# Patient Record
Sex: FEMALE | URBAN - METROPOLITAN AREA
[De-identification: names, ages, dates, MRNs, and addresses within clinical notes are randomized per-mention and may not be internally consistent; named-entity substitution may affect disease eponyms.]

---

## 2021-10-15 ENCOUNTER — EMERGENCY (EMERGENCY)
Facility: HOSPITAL | Age: 36
LOS: 1 days | Discharge: LEFT BEFORE DISCHARGE | End: 2021-10-15
Attending: EMERGENCY MEDICINE | Admitting: EMERGENCY MEDICINE
Payer: MEDICAID

## 2021-10-15 VITALS
RESPIRATION RATE: 14 BRPM | HEART RATE: 110 BPM | SYSTOLIC BLOOD PRESSURE: 190 MMHG | OXYGEN SATURATION: 95 % | DIASTOLIC BLOOD PRESSURE: 79 MMHG

## 2021-10-15 DIAGNOSIS — R55 SYNCOPE AND COLLAPSE: ICD-10-CM

## 2021-10-15 DIAGNOSIS — G43.909 MIGRAINE, UNSPECIFIED, NOT INTRACTABLE, WITHOUT STATUS MIGRAINOSUS: ICD-10-CM

## 2021-10-15 DIAGNOSIS — Z53.29 PROCEDURE AND TREATMENT NOT CARRIED OUT BECAUSE OF PATIENT'S DECISION FOR OTHER REASONS: ICD-10-CM

## 2021-10-15 DIAGNOSIS — R00.0 TACHYCARDIA, UNSPECIFIED: ICD-10-CM

## 2021-10-15 LAB
ALBUMIN SERPL ELPH-MCNC: 3.9 G/DL — SIGNIFICANT CHANGE UP (ref 3.4–5)
ALP SERPL-CCNC: 97 U/L — SIGNIFICANT CHANGE UP (ref 40–120)
ALT FLD-CCNC: 63 U/L — HIGH (ref 12–42)
AMPHET UR-MCNC: NEGATIVE — SIGNIFICANT CHANGE UP
ANION GAP SERPL CALC-SCNC: 11 MMOL/L — SIGNIFICANT CHANGE UP (ref 9–16)
APPEARANCE UR: CLEAR — SIGNIFICANT CHANGE UP
AST SERPL-CCNC: 49 U/L — HIGH (ref 15–37)
BACTERIA # UR AUTO: PRESENT /HPF
BARBITURATES UR SCN-MCNC: NEGATIVE — SIGNIFICANT CHANGE UP
BASOPHILS # BLD AUTO: 0.03 K/UL — SIGNIFICANT CHANGE UP (ref 0–0.2)
BASOPHILS NFR BLD AUTO: 0.3 % — SIGNIFICANT CHANGE UP (ref 0–2)
BENZODIAZ UR-MCNC: NEGATIVE — SIGNIFICANT CHANGE UP
BILIRUB SERPL-MCNC: 0.6 MG/DL — SIGNIFICANT CHANGE UP (ref 0.2–1.2)
BILIRUB UR-MCNC: NEGATIVE — SIGNIFICANT CHANGE UP
BUN SERPL-MCNC: 9 MG/DL — SIGNIFICANT CHANGE UP (ref 7–23)
CALCIUM SERPL-MCNC: 9.6 MG/DL — SIGNIFICANT CHANGE UP (ref 8.5–10.5)
CHLORIDE SERPL-SCNC: 105 MMOL/L — SIGNIFICANT CHANGE UP (ref 96–108)
CO2 SERPL-SCNC: 25 MMOL/L — SIGNIFICANT CHANGE UP (ref 22–31)
COCAINE METAB.OTHER UR-MCNC: NEGATIVE — SIGNIFICANT CHANGE UP
COLOR SPEC: YELLOW — SIGNIFICANT CHANGE UP
CREAT SERPL-MCNC: 0.77 MG/DL — SIGNIFICANT CHANGE UP (ref 0.5–1.3)
DIFF PNL FLD: ABNORMAL
EOSINOPHIL # BLD AUTO: 0.04 K/UL — SIGNIFICANT CHANGE UP (ref 0–0.5)
EOSINOPHIL NFR BLD AUTO: 0.4 % — SIGNIFICANT CHANGE UP (ref 0–6)
EPI CELLS # UR: SIGNIFICANT CHANGE UP /HPF (ref 0–5)
ETHANOL SERPL-MCNC: <3 MG/DL — SIGNIFICANT CHANGE UP
GLUCOSE SERPL-MCNC: 104 MG/DL — HIGH (ref 70–99)
GLUCOSE UR QL: NEGATIVE — SIGNIFICANT CHANGE UP
HCG UR QL: NEGATIVE — SIGNIFICANT CHANGE UP
HCT VFR BLD CALC: 41.1 % — SIGNIFICANT CHANGE UP (ref 34.5–45)
HGB BLD-MCNC: 13.7 G/DL — SIGNIFICANT CHANGE UP (ref 11.5–15.5)
IMM GRANULOCYTES NFR BLD AUTO: 0.5 % — SIGNIFICANT CHANGE UP (ref 0–1.5)
KETONES UR-MCNC: 15 MG/DL
LEUKOCYTE ESTERASE UR-ACNC: NEGATIVE — SIGNIFICANT CHANGE UP
LYMPHOCYTES # BLD AUTO: 2.18 K/UL — SIGNIFICANT CHANGE UP (ref 1–3.3)
LYMPHOCYTES # BLD AUTO: 22.5 % — SIGNIFICANT CHANGE UP (ref 13–44)
MCHC RBC-ENTMCNC: 27.5 PG — SIGNIFICANT CHANGE UP (ref 27–34)
MCHC RBC-ENTMCNC: 33.3 GM/DL — SIGNIFICANT CHANGE UP (ref 32–36)
MCV RBC AUTO: 82.5 FL — SIGNIFICANT CHANGE UP (ref 80–100)
METHADONE UR-MCNC: NEGATIVE — SIGNIFICANT CHANGE UP
MONOCYTES # BLD AUTO: 0.52 K/UL — SIGNIFICANT CHANGE UP (ref 0–0.9)
MONOCYTES NFR BLD AUTO: 5.4 % — SIGNIFICANT CHANGE UP (ref 2–14)
NEUTROPHILS # BLD AUTO: 6.86 K/UL — SIGNIFICANT CHANGE UP (ref 1.8–7.4)
NEUTROPHILS NFR BLD AUTO: 70.9 % — SIGNIFICANT CHANGE UP (ref 43–77)
NITRITE UR-MCNC: NEGATIVE — SIGNIFICANT CHANGE UP
NRBC # BLD: 0 /100 WBCS — SIGNIFICANT CHANGE UP (ref 0–0)
OPIATES UR-MCNC: NEGATIVE — SIGNIFICANT CHANGE UP
PCP SPEC-MCNC: SIGNIFICANT CHANGE UP
PCP SPEC-MCNC: SIGNIFICANT CHANGE UP
PCP UR-MCNC: NEGATIVE — SIGNIFICANT CHANGE UP
PH UR: 6 — SIGNIFICANT CHANGE UP (ref 5–8)
PLATELET # BLD AUTO: 379 K/UL — SIGNIFICANT CHANGE UP (ref 150–400)
POTASSIUM SERPL-MCNC: 4.3 MMOL/L — SIGNIFICANT CHANGE UP (ref 3.5–5.3)
POTASSIUM SERPL-SCNC: 4.3 MMOL/L — SIGNIFICANT CHANGE UP (ref 3.5–5.3)
PROT SERPL-MCNC: 7.8 G/DL — SIGNIFICANT CHANGE UP (ref 6.4–8.2)
PROT UR-MCNC: NEGATIVE MG/DL — SIGNIFICANT CHANGE UP
RBC # BLD: 4.98 M/UL — SIGNIFICANT CHANGE UP (ref 3.8–5.2)
RBC # FLD: 12.8 % — SIGNIFICANT CHANGE UP (ref 10.3–14.5)
RBC CASTS # UR COMP ASSIST: < 5 /HPF — SIGNIFICANT CHANGE UP
SODIUM SERPL-SCNC: 141 MMOL/L — SIGNIFICANT CHANGE UP (ref 132–145)
SP GR SPEC: <=1.005 — SIGNIFICANT CHANGE UP (ref 1–1.03)
THC UR QL: NEGATIVE — SIGNIFICANT CHANGE UP
TROPONIN I SERPL-MCNC: <0.017 NG/ML — LOW (ref 0.02–0.06)
UROBILINOGEN FLD QL: 0.2 E.U./DL — SIGNIFICANT CHANGE UP
WBC # BLD: 9.68 K/UL — SIGNIFICANT CHANGE UP (ref 3.8–10.5)
WBC # FLD AUTO: 9.68 K/UL — SIGNIFICANT CHANGE UP (ref 3.8–10.5)
WBC UR QL: < 5 /HPF — SIGNIFICANT CHANGE UP

## 2021-10-15 PROCEDURE — 99291 CRITICAL CARE FIRST HOUR: CPT

## 2021-10-15 PROCEDURE — 70450 CT HEAD/BRAIN W/O DYE: CPT | Mod: 26

## 2021-10-15 PROCEDURE — 93010 ELECTROCARDIOGRAM REPORT: CPT

## 2021-10-15 RX ORDER — ONDANSETRON 8 MG/1
8 TABLET, FILM COATED ORAL ONCE
Refills: 0 | Status: DISCONTINUED | OUTPATIENT
Start: 2021-10-15 | End: 2021-10-15

## 2021-10-15 RX ORDER — NALOXONE HYDROCHLORIDE 4 MG/.1ML
4 SPRAY NASAL ONCE
Refills: 0 | Status: COMPLETED | OUTPATIENT
Start: 2021-10-15 | End: 2021-10-15

## 2021-10-15 RX ORDER — SODIUM CHLORIDE 9 MG/ML
1000 INJECTION INTRAMUSCULAR; INTRAVENOUS; SUBCUTANEOUS ONCE
Refills: 0 | Status: COMPLETED | OUTPATIENT
Start: 2021-10-15 | End: 2021-10-15

## 2021-10-15 RX ORDER — NALOXONE HYDROCHLORIDE 4 MG/.1ML
0.1 SPRAY NASAL ONCE
Refills: 0 | Status: COMPLETED | OUTPATIENT
Start: 2021-10-15 | End: 2021-10-15

## 2021-10-15 RX ORDER — ONDANSETRON 8 MG/1
4 TABLET, FILM COATED ORAL ONCE
Refills: 0 | Status: COMPLETED | OUTPATIENT
Start: 2021-10-15 | End: 2021-10-15

## 2021-10-15 RX ORDER — NALOXONE HYDROCHLORIDE 4 MG/.1ML
0.2 SPRAY NASAL ONCE
Refills: 0 | Status: COMPLETED | OUTPATIENT
Start: 2021-10-15 | End: 2021-10-15

## 2021-10-15 RX ADMIN — SODIUM CHLORIDE 2000 MILLILITER(S): 9 INJECTION INTRAMUSCULAR; INTRAVENOUS; SUBCUTANEOUS at 19:06

## 2021-10-15 RX ADMIN — Medication 0.5 MILLIGRAM(S): at 19:06

## 2021-10-15 RX ADMIN — NALOXONE HYDROCHLORIDE 0.1 MILLIGRAM(S): 4 SPRAY NASAL at 19:08

## 2021-10-15 RX ADMIN — NALOXONE HYDROCHLORIDE 0.1 MILLIGRAM(S): 4 SPRAY NASAL at 19:13

## 2021-10-15 RX ADMIN — ONDANSETRON 4 MILLIGRAM(S): 8 TABLET, FILM COATED ORAL at 19:00

## 2021-10-15 RX ADMIN — NALOXONE HYDROCHLORIDE 0.2 MILLIGRAM(S): 4 SPRAY NASAL at 19:50

## 2021-10-15 RX ADMIN — NALOXONE HYDROCHLORIDE 4 MILLIGRAM(S): 4 SPRAY NASAL at 18:58

## 2021-10-15 NOTE — ED PROVIDER NOTE - OBJECTIVE STATEMENT
36-year-old woman with unknown past medical history who is brought to the ED as a "notification" by EMS after witnessed sudden loss of consciousness while out at a bar today. She had normal VS and fingerstick of 102 in the field and was breathing normally, protecting her airway, but remained completely unresponsive. No stigmata of trauma or drug use. On arrival she was given trial of 2mg IN naloxone and immediately gasped and became more responsive but remained noninteractive and not following commands. 36-year-old woman with unknown past medical history who is brought to the ED as a "notification" by EMS after witnessed sudden loss of consciousness while walking on the sidewalk today. She had normal VS and fingerstick of 102 in the field and was breathing normally, protecting her airway, but remained completely unresponsive. No stigmata of trauma or drug use. On arrival she was given trial of 2mg IN naloxone and immediately gasped and became more responsive but remained noninteractive and not following commands.

## 2021-10-15 NOTE — ED PROVIDER NOTE - PATIENT PORTAL LINK FT
You can access the FollowMyHealth Patient Portal offered by [facility] by registering at the following website: [email or web address]. By joining "[employer]’s FollowMyHealth portal, you will also be able to view your health information using other applications (apps) compatible with our system.

## 2021-10-15 NOTE — ED PROVIDER NOTE - CRITICAL CARE ATTENDING CONTRIBUTION TO CARE
The patient was seen immediately upon arrival due to a high probability of imminent or life-threatening deterioration secondary to sudden unexplained loss of consciousness, which required my direct attention, intervention, and personal management at the bedside. I have personally provided critical care time exclusive of time spent on separately billable procedures. Time includes review of laboratory data, radiology results, discussion with consultants, discussion with the patient's family, and monitoring for potential decompensation.

## 2021-10-15 NOTE — ED ADULT NURSE REASSESSMENT NOTE - NS ED NURSE REASSESS COMMENT FT1
report received from RN Kimberly, pt on 2L NC O2 sat 100%. Airway patent, pt pending dispo, Will continue to monitor and reassess. Safety and comfort maintained. report received from RN Kimberly, pt on 2L NC O2 sat 100%. Airway patent, remians on CCM,. etco2,and pulse ox. pt pending dispo, Will continue to monitor and reassess. Safety and comfort maintained.

## 2021-10-15 NOTE — ED ADULT TRIAGE NOTE - CHIEF COMPLAINT QUOTE
Patient to ED as EMS notification for syncopal episode and responsive to only painful stimuli.  Stat team in resus upon patient arrival

## 2021-10-15 NOTE — ED PROVIDER NOTE - CLINICAL SUMMARY MEDICAL DECISION MAKING FREE TEXT BOX
Ddx is broad and includes alcohol or drug overdose, syncope, seizure, CNS mass, arrhythmia, among many others. On arrival she was tachycardic but other VS normal and airway and breathing were intact despite unresponsiveness. She responded to narcan, but utox was negative and ultimately patient's mental status improved and she absolutely denied any drug use, either intentional or accidental. She did agree to admission and was accepted by Dr. Bell to EMU for EEG monitoring and further workup.

## 2021-10-15 NOTE — ED PROVIDER NOTE - PROGRESS NOTE DETAILS
patient gradually became more responsive and was unable to give any history of events of today. she speaks [place] and was interviewed using [place] phone . she says she has a history of "migraine problem" that involves episodes of being unable to move her hands and feet, but what happened today is different. she is not sure what happened today but absolutely denies any drinking or drug use. she agreed to admission and I discussed case with hospitalist  and EMU attending Dr. Bell who accepted her for admission, but then patient changed her mind and said she cannot stay for admission because there is nobody available to stay home with her children. The patient wishes to leave against medical advice because she cannot find anyone to be with her two children at home right now.  Using the Pepe language line  [id number] I have discussed the risks, benefits and alternatives (including the possibility of worsening of disease, pain, permanent disability, and/or death) with the patient and his/her family (if available).  The patient voices understanding of these risks, benefits, and alternatives and still wishes to sign out against medical advice.  The patient is awake, alert, oriented  x 3 and has demonstrated capacity to refuse/direct care.  I have advised the patient that they can and should return immediately should they develop any worse/different/additional symptoms, or if they change their mind and want to continue their care.

## 2021-10-15 NOTE — ED PROVIDER NOTE - PHYSICAL EXAMINATION
Constitutional: well-kempt, obtunded, not arousable to painful stimuli, no stigmata of trauma or drug abuse  HEENT: head normocephalic and atraumatic. moist mucous membranes  Eyes: pupils not pinpoint, equal size, extraocular movements intact, normal conjunctiva  Neck: supple, normal ROM  Cardiovascular: tachycardic  Pulmonary: no respiratory distress. lungs clear to auscultation bilaterally  Gastrointestinal: abdomen soft, nontender, nondistended, no rebound or guarding  Genitourinary: normal external genitalia  Skin: warm, dry, normal for ethnicity  Musculoskeletal: no edema, no deformity  Neurological: unresponsive to painful stimuli, normal airway reflexes, normal tone, no jerking movements  Psychiatric: unresponsive

## 2021-10-15 NOTE — ED ADULT NURSE NOTE - OBJECTIVE STATEMENT
Pt came in c/o of ams. Pt brought in as note. As per EMS pt found synopsized by bystanders. No s/s of trauma/bleeding/head injury. PT arrives responsive to painful stimuli. Pt given 4mg Narcan IN upon arrival with response. PT responsive to verbal with opening eyes, crying, and stating "My phone, my phone my phone..." Pt placed on continuous o2/cardiac/eto2 monitoring. PT currently resting comfortably in bed. 02/eto2 wnl. Pt currently awake and alert. Will continue to monitor. Bed alarm on. Pt came in c/o of ams. Pt brought in as note. As per EMS pt found synopsized by bystanders. No s/s of trauma/bleeding/head injury. PT arrives responsive to painful stimuli. Pt given 2mg Narcan IN upon arrival with response. PT responsive to verbal with opening eyes, crying, and stating "My phone, my phone my phone..." Pt placed on continuous o2/cardiac/eto2 monitoring. PT currently resting comfortably in bed. 02/eto2 wnl. Pt currently awake and alert. Will continue to monitor. Bed alarm on.

## 2021-10-15 NOTE — ED PROVIDER NOTE - NS_BEDUNITTYPES_ED_ALL_ED
3/8/2019       RE: Sola Silverman  [address] [place] [address]     Dear Colleague,    Thank you for referring your patient, Sola Silverman, to the [facility]  at [facility]. Please see a copy of my visit note below.    HPI  Patient is here for follow-up on hypertension.  She states that she has been feeling well.  She has not noticed any significant side effects of medications.    Review of Systems     Constitutional:  Negative for fever and fatigue.   Eyes:  Negative for tunnel vision.   Respiratory:   Negative for cough and dyspnea on exertion.    Cardiovascular:  Negative for chest pain, dyspnea on exertion and edema.   Gastrointestinal:  Negative for nausea, abdominal pain and diarrhea.   Genitourinary:  Negative for flank pain.   Skin:  Negative for itching and rash.   Psychiatric/Behavioral:  Negative for depression, decreased concentration, mood swings and panic attacks.    Endocrine:  Negative for altered temperature regulation, polyphagia, polydipsia, unwanted hair growth and change in facial hair.    Current Outpatient Medications   Medication     aspirin 81 MG tablet     blood glucose monitoring (ACCU-CHEK COMPACT CARE KIT) meter device kit     blood glucose monitoring (ACCU-CHEK SMARTVIEW) test strip     Blood Glucose Monitoring Suppl (DIANELYS CONTOUR NEXT MONITOR) W/DEVICE KIT     Calcium-Vitamin D (CALCIUM + D PO)     Cetirizine HCl (ZYRTEC PO)     hydrochlorothiazide (HYDRODIURIL) 12.5 MG tablet     losartan (COZAAR) 100 MG tablet     metFORMIN (GLUCOPHAGE-XR) 500 MG 24 hr tablet     simvastatin (ZOCOR) 20 MG tablet     VITAMIN D, CHOLECALCIFEROL, PO     YUVAFEM 10 MCG TABS vaginal tablet     No current facility-administered medications for this visit.      Vitals:    03/08/19 1027 03/08/19 1028 03/08/19 1029 03/08/19 1030   BP: 130/85 128/85 124/85 127/85   Pulse: 79 79 79 79   Weight: 122.9 kg (271 lb)          Physical  Exam   Constitutional: She appears well-developed and well-nourished.   HENT:   Head: Normocephalic and atraumatic.   Eyes: EOM are normal. Pupils are equal, round, and reactive to light.   Neck: Normal range of motion. Neck supple.   Cardiovascular: Normal rate and regular rhythm.   Pulmonary/Chest: Effort normal and breath sounds normal.   Musculoskeletal: She exhibits no edema.   Skin: Skin is warm and dry.   Psychiatric: She has a normal mood and affect. Her behavior is normal. Judgment and thought content normal.   Nursing note and vitals reviewed.      Assessment and plan:    Sola was seen today for recheck.    Diagnoses and all orders for this visit:    Essential hypertension.  Blood pressure is slightly above goal.  Recommend to continue with titration of hydrochlorothiazide this patient has been able to tolerate well.  Follow-up in 2 weeks was advised to review progress as well as to check chemistry panel.  Patient is in agreement with the plan.  -     hydrochlorothiazide (HYDRODIURIL) 25 MG tablet; Take 1 tablet (25 mg) by mouth daily  -     Basic Metabolic Panel; Future      Total time spent 15 minutes.  More than 50% of the time spent with Ms. Silverman on counseling / coordinating her care    Alda Santamaria MD             EMU

## 2021-10-15 NOTE — ED PROVIDER NOTE - INTERPRETATION
sinus tachycardia at 118bpm. diffuse T wave flattening. No ST elevations or depressions. NOrmal axis. Normal intervals. No ectopy.

## 2021-10-16 VITALS
DIASTOLIC BLOOD PRESSURE: 69 MMHG | HEART RATE: 93 BPM | OXYGEN SATURATION: 100 % | TEMPERATURE: 98 F | RESPIRATION RATE: 18 BRPM | SYSTOLIC BLOOD PRESSURE: 101 MMHG

## 2023-01-10 NOTE — ED PROVIDER NOTE - CADM POA URETHRAL CATHETER
----- Message from Jeanette Tovar sent at 1/10/2023 10:26 AM CST -----  Pt would like an earlier time for her appt on 01/17 and she would also like her lab orders placed. [phone]     No
